# Patient Record
Sex: FEMALE | Race: WHITE | NOT HISPANIC OR LATINO | URBAN - METROPOLITAN AREA
[De-identification: names, ages, dates, MRNs, and addresses within clinical notes are randomized per-mention and may not be internally consistent; named-entity substitution may affect disease eponyms.]

---

## 2020-08-06 ENCOUNTER — OFFICE VISIT (OUTPATIENT)
Dept: URBAN - METROPOLITAN AREA CLINIC 33 | Facility: CLINIC | Age: 65
End: 2020-08-06

## 2020-09-03 ENCOUNTER — OFFICE VISIT (OUTPATIENT)
Dept: URBAN - METROPOLITAN AREA CLINIC 33 | Facility: CLINIC | Age: 65
End: 2020-09-03

## 2020-09-03 VITALS
SYSTOLIC BLOOD PRESSURE: 126 MMHG | HEART RATE: 76 BPM | OXYGEN SATURATION: 96 % | DIASTOLIC BLOOD PRESSURE: 72 MMHG | HEIGHT: 65 IN

## 2020-09-03 RX ORDER — FLUTICASONE PROPIONATE 50 UG/1
SPRAY 1 SPRAY INTO EACH NOSTRIL EVERY DAY SPRAY, METERED NASAL
Qty: 48 G | Refills: 0 | Status: ACTIVE | COMMUNITY

## 2020-09-03 RX ORDER — RANITIDINE HYDROCHLORIDE 150 MG/1
1 TABLET TABLET, FILM COATED ORAL
Status: DISCONTINUED | COMMUNITY

## 2020-09-03 RX ORDER — ASPIRIN 81 MG/1
1 TABLET TABLET, COATED ORAL ONCE A DAY
Status: ACTIVE | COMMUNITY

## 2020-09-03 RX ORDER — ESTRADIOL 0.1 MG/D
1 PATCH TO SKIN PATCH, EXTENDED RELEASE TRANSDERMAL
Status: ACTIVE | COMMUNITY

## 2020-09-03 RX ORDER — LACTOBACIL 2/BIFIDO 1/S.THERMO 450B CELL
AS DIRECTED PACKET (EA) ORAL
Status: ACTIVE | COMMUNITY
Start: 2015-09-03

## 2020-09-03 RX ORDER — UMECLIDINIUM BROMIDE AND VILANTEROL TRIFENATATE 62.5; 25 UG/1; UG/1
1 PUFF POWDER RESPIRATORY (INHALATION) ONCE A DAY
Status: ON HOLD | COMMUNITY

## 2020-09-03 RX ORDER — PIOGLITAZONE 30 MG/1
1 TABLET TABLET ORAL ONCE A DAY
Status: ACTIVE | COMMUNITY

## 2020-09-03 RX ORDER — COLESEVELAM HYDROCHLORIDE 625 MG/1
1-2  TABLET TABLET, FILM COATED ORAL
Status: DISCONTINUED | COMMUNITY
Start: 2017-04-07

## 2020-09-03 RX ORDER — OMEPRAZOLE 20 MG/1
1 CAPSULE CAPSULE, DELAYED RELEASE ORAL ONCE A DAY
Status: ON HOLD | COMMUNITY
Start: 2015-12-03

## 2020-09-03 RX ORDER — INSULIN GLARGINE 300 U/ML
INJECT 40 UNITS EVERY DAY AT BEDTIME FOR 90 DAYS INJECTION, SOLUTION SUBCUTANEOUS
Qty: 13.5 MILLILITER | Refills: 1 | Status: ACTIVE | COMMUNITY

## 2020-09-03 RX ORDER — PROPRANOLOL HYDROCHLORIDE 60 MG/1
TAKE 1 CAPSULE BY MOUTH EVERY DAY CAPSULE, EXTENDED RELEASE ORAL
Qty: 90 UNSPECIFIED | Refills: 0 | Status: ACTIVE | COMMUNITY

## 2020-09-03 RX ORDER — LACTOBACIL 2/BIFIDO 1/S.THERMO 900B CELL
AS DIRECTED PACKET (EA) ORAL
Status: ACTIVE | COMMUNITY

## 2020-09-03 RX ORDER — SEMAGLUTIDE 1.34 MG/ML
INJECT 0.5MG ONCE A WEEK AS DIRECTED INJECTION, SOLUTION SUBCUTANEOUS
Qty: 4.5 MILLILITER | Refills: 0 | Status: ACTIVE | COMMUNITY

## 2020-09-03 RX ORDER — CYANOCOBALAMIN (VITAMIN B-12) 1000 MCG
1 TABLET TABLET ORAL ONCE A DAY
Status: ACTIVE | COMMUNITY

## 2020-09-03 RX ORDER — ESTRADIOL 0.1 MG/D
1 PATCH TO SKIN PATCH TRANSDERMAL
Status: ACTIVE | COMMUNITY

## 2020-09-03 RX ORDER — CANAGLIFLOZIN 100 MG/1
1 TABLET TABLET, FILM COATED ORAL ONCE A DAY
Status: DISCONTINUED | COMMUNITY

## 2020-09-03 RX ORDER — SITAGLIPTIN 100 MG/1
1 TABLET TABLET, FILM COATED ORAL ONCE A DAY
Status: ACTIVE | COMMUNITY

## 2020-09-03 RX ORDER — FLUTICASONE PROPIONATE AND SALMETEROL 50; 250 UG/1; UG/1
1 PUFF POWDER RESPIRATORY (INHALATION) TWICE A DAY
Status: ON HOLD | COMMUNITY

## 2020-09-03 RX ORDER — RIFAXIMIN 550 MG/1
1 TABLET TABLET ORAL THREE TIMES A DAY
Qty: 42 TABLET | Refills: 2 | OUTPATIENT
Start: 2020-09-03

## 2020-09-03 RX ORDER — PROPRANOLOL HYDROCHLORIDE 60 MG/1
1 TABLET TABLET ORAL TWICE A DAY
Status: ACTIVE | COMMUNITY

## 2020-09-03 NOTE — EXAM-MIGRATED EXAMINATIONS
ABDOMEN: - bowel sounds present, no masses palpable, no organomegaly , no rebound tenderness, soft, nontender, nondistended;

## 2020-09-03 NOTE — HPI-MIGRATED HPI
;   ;   ;   ;   ;   ;   ;   ;   ;   ;     Heartburn : Patient presents today for a follow up of heartburn.  She has been taking Pepcid AC prn, maybe 2-3 times a month with relief of esophageal burning.  Admit associated cough, and horseness that occur post prandial. .    Since Dec. 2019 she admit 4 episodes of symptoms that begin with sulfur burps, then nausea diarrhea and vomiting.    Last visit (5/11/2017) voids fried foods, greasy foods, tomato-based foods and other acidic foods.   Symptoms characterized as retrosternal tightening accompanied by burning sensation and sharp pain at the base of the throat with an associated dry throat. Onset was over 10 years ago. She admits associated nausea without emesis. She denies dysphagia, change in appetite. She does admit intermittent early satiety.  Of note, she tried Omeprazole 20 MG x 1 week and it caused diarrhea episodes and indigestion. ;   Early Satiety : Patient admit episodes of early satiety.  She will get mild relief with consuming small portion meals.      Last visit (5/11/2017) Patient continues to admit intermittent early satiety that occurs a few days a week, since the year.  She denies a decreased appetite.  She has been trying to eat small meals throughout the day but the nature of her work makes it difficult to eat multiple times in a work day.;   Abdominal Pain : Patient admit episodes of abdominal pain.  Described as a dull ache that is located lateral to umbilicus radiating to lower abdomen. Lasting from minutes to hours at a time.   Since Dec. 2019 she admit 4 episodes of symptoms that begin with sulfur burps, then nausea diarrhea and vomiting.       Last visit (5/11/2017)  Patient admits marked improvement to generalized abdominal pain symptoms since starting on Viberzi. She has continued to deny any episodes of the pain since coming off the medication after 3/30/17. ;   Nausea/Vomiting : Since Dec. 2019 she admit 4 episodes of symptoms that begin with sulfur burps, then nausea, diarrhea and vomiting.      Last visit (5/11/2017)  Patient admits marked improvement to nausea since coming off Trulicity. Denies any episodes of nausea since her last visit.;   Bloating : Continue to experience generalized abdominal bloating. Symptoms are present upon waking up and will persist throughout the day.  Has tried Gas-X without benefit.   Last visit (5/11/2017) Patient admits improvement to bloating since her last visit. Symptoms occur intermittently, a few times per week usually after eating. Denies gassiness.;   Fecal incontinence : Patient admit at least 2 episodes of fecal incontinence per month.     Last visit (5/11/2017)  Patient continues to deny any episodes of fecal incontinence or urgency despite coming off the Viberzi. She continues to take VSL#3 and Welchol 1 tablet per day.;   Melena : Denies any new episodes of melena.    Last visit (5/11/2017)  Patient continues to deny any melena.;   Shortness of Breath : Patient admits episodes of Shortness of Breathe that has been present since 2012 ,which she attribute to fibromyalgia.  She report being told she had beginning stage of emphysemas in 2012.  She admit having a pulmonary follow up with Dr. Catherine at [facility] 2 years ago with normal findings. ;   IBS-D : Currently admit 5-6 bowel movements per day. Stools are form to loose to watery.  She has been on a bland diet for the past 3-4 years with a little decrease in the frequency.   Denies any melena, blood or mucus in stool.     Last visit (5/11/2017)  Patient presents today to follow up for her IBS-D. Patient continues to take VSL#3 and is now taking Welchol daily with good control of symptoms. She can only recall about 1 bout of diarrhea since her last visit. She will use Imodium prn (infrequently).  Of note, patient was contacted by our office on March 24, 2017 and notified to stop taking Viberzi as per recent literature from the manufacture concerning Viberzi treatment with patients who have had a cholecystectomy. She has discontinued the medication and started on Welchol 625 mg and is currently taking 1 pill per day as she states any more than this will cause her to have abdominal discomfort. She started on EnteraGam but she states she discontinued it after 2 weeks because she felt it made her symptoms worse, and it made her symptoms. She continues to adhere to a bland diet.  She currently admit 2 bowel movements per day with normal and formed stools.  Denies any melena, blood or mucus in stool.   She was first diagnosed in the mid 90's. Course has been persistent for the last 20 years. Patient was diagnosed with Type II Diabetes June 1, 2016 and was initially treated with Metformin 1000 mg QD, which caused her diarrhea episodes to increase.  She is currently being treated with Invokana 100 mg qd, Januvia 100 mg and Pioglitazone HCl 30 MG Tablet.  Initially she was on Metformin 1000 mg 2 x per day. She developed an allergic reaction, so she was taken off Metformin and put on Glipizide which did not keep her sugar low enough. She subsequently began taking time-released Metformin 1000 mg 2 x per day in tandem with the Glipizide, then Metformin was increased to 2000 mg, then it was reduced to 1000 mg again, as diarrhea and associated digestive problems were exacerbated at the higher dose.  She states that she has been avoiding certain kinds of dietary fibers in order to prevent diarrhea, but she admits that almost any vegetables she eats will aggravate her symptoms.   Denies any fecal incontinence since her last visit.   ;   Cough : Admit cough, and hoarseness associated with heartburn.  Symptoms occur post prandial, but not with every meal. Onset 4-5 years ago.  Denies aggravating or alleviating factors.;

## 2020-10-21 ENCOUNTER — TELEPHONE ENCOUNTER (OUTPATIENT)
Dept: URBAN - METROPOLITAN AREA CLINIC 35 | Facility: CLINIC | Age: 65
End: 2020-10-21

## 2020-11-01 ENCOUNTER — LAB OUTSIDE AN ENCOUNTER (OUTPATIENT)
Dept: URBAN - METROPOLITAN AREA CLINIC 35 | Facility: CLINIC | Age: 65
End: 2020-11-01

## 2020-11-05 LAB
CALPROTECTIN, STOOL - QDX: (no result)
GASTROINTESTINAL PATHOGEN: (no result)
PANCREATICELASTASE ELISA, STOOL: (no result)
STOOL, WHITE BLOOD CELLS: (no result)

## 2020-11-09 ENCOUNTER — TELEPHONE ENCOUNTER (OUTPATIENT)
Dept: URBAN - METROPOLITAN AREA CLINIC 35 | Facility: CLINIC | Age: 65
End: 2020-11-09

## 2020-12-01 ENCOUNTER — TELEPHONE ENCOUNTER (OUTPATIENT)
Dept: URBAN - METROPOLITAN AREA CLINIC 35 | Facility: CLINIC | Age: 65
End: 2020-12-01

## 2020-12-03 ENCOUNTER — OFFICE VISIT (OUTPATIENT)
Dept: URBAN - METROPOLITAN AREA CLINIC 33 | Facility: CLINIC | Age: 65
End: 2020-12-03

## 2020-12-03 VITALS
HEART RATE: 68 BPM | DIASTOLIC BLOOD PRESSURE: 64 MMHG | WEIGHT: 227 LBS | HEIGHT: 65 IN | TEMPERATURE: 96.8 F | OXYGEN SATURATION: 97 % | BODY MASS INDEX: 37.82 KG/M2 | SYSTOLIC BLOOD PRESSURE: 126 MMHG

## 2020-12-03 RX ORDER — CYANOCOBALAMIN (VITAMIN B-12) 1000 MCG
1 TABLET TABLET ORAL ONCE A DAY
Status: ACTIVE | COMMUNITY

## 2020-12-03 RX ORDER — PROPRANOLOL HYDROCHLORIDE 60 MG/1
1 TABLET TABLET ORAL TWICE A DAY
Status: ACTIVE | COMMUNITY

## 2020-12-03 RX ORDER — RIFAXIMIN 550 MG/1
1 TABLET TABLET ORAL THREE TIMES A DAY
Qty: 42 TABLET | Refills: 2 | Status: ACTIVE | COMMUNITY
Start: 2020-09-03

## 2020-12-03 RX ORDER — OMEPRAZOLE 20 MG/1
1 CAPSULE CAPSULE, DELAYED RELEASE ORAL ONCE A DAY
Status: ON HOLD | COMMUNITY
Start: 2015-12-03

## 2020-12-03 RX ORDER — LACTOBACIL 2/BIFIDO 1/S.THERMO 450B CELL
AS DIRECTED PACKET (EA) ORAL
Status: ACTIVE | COMMUNITY
Start: 2015-09-03

## 2020-12-03 RX ORDER — SITAGLIPTIN 100 MG/1
1 TABLET TABLET, FILM COATED ORAL ONCE A DAY
Status: ACTIVE | COMMUNITY

## 2020-12-03 RX ORDER — FLUTICASONE PROPIONATE 50 UG/1
SPRAY 1 SPRAY INTO EACH NOSTRIL EVERY DAY SPRAY, METERED NASAL
Qty: 48 G | Refills: 0 | Status: ACTIVE | COMMUNITY

## 2020-12-03 RX ORDER — FLUTICASONE PROPIONATE AND SALMETEROL 50; 250 UG/1; UG/1
1 PUFF POWDER RESPIRATORY (INHALATION) TWICE A DAY
Status: ON HOLD | COMMUNITY

## 2020-12-03 RX ORDER — INSULIN GLARGINE 300 U/ML
INJECT 40 UNITS EVERY DAY AT BEDTIME FOR 90 DAYS INJECTION, SOLUTION SUBCUTANEOUS
Qty: 13.5 MILLILITER | Refills: 1 | Status: ACTIVE | COMMUNITY

## 2020-12-03 RX ORDER — SEMAGLUTIDE 1.34 MG/ML
INJECT 0.5MG ONCE A WEEK AS DIRECTED INJECTION, SOLUTION SUBCUTANEOUS
Qty: 4.5 MILLILITER | Refills: 0 | Status: ACTIVE | COMMUNITY

## 2020-12-03 RX ORDER — ESTRADIOL 0.1 MG/D
1 PATCH TO SKIN PATCH TRANSDERMAL
Status: ACTIVE | COMMUNITY

## 2020-12-03 RX ORDER — ESTRADIOL 0.1 MG/D
1 PATCH TO SKIN PATCH, EXTENDED RELEASE TRANSDERMAL
Status: ACTIVE | COMMUNITY

## 2020-12-03 RX ORDER — PROPRANOLOL HYDROCHLORIDE 60 MG/1
TAKE 1 CAPSULE BY MOUTH EVERY DAY CAPSULE, EXTENDED RELEASE ORAL
Qty: 90 UNSPECIFIED | Refills: 0 | Status: ON HOLD | COMMUNITY

## 2020-12-03 RX ORDER — PIOGLITAZONE 30 MG/1
1 TABLET TABLET ORAL ONCE A DAY
Status: ACTIVE | COMMUNITY

## 2020-12-03 RX ORDER — RIFAXIMIN 550 MG/1
1 TABLET TABLET ORAL THREE TIMES A DAY
Qty: 42 TABLET | Refills: 2 | OUTPATIENT
Start: 2020-09-03

## 2020-12-03 RX ORDER — LACTOBACIL 2/BIFIDO 1/S.THERMO 900B CELL
AS DIRECTED PACKET (EA) ORAL
Status: ON HOLD | COMMUNITY

## 2020-12-03 RX ORDER — ASPIRIN 81 MG/1
1 TABLET TABLET, COATED ORAL ONCE A DAY
Status: ACTIVE | COMMUNITY

## 2020-12-03 RX ORDER — UMECLIDINIUM BROMIDE AND VILANTEROL TRIFENATATE 62.5; 25 UG/1; UG/1
1 PUFF POWDER RESPIRATORY (INHALATION) ONCE A DAY
Status: ON HOLD | COMMUNITY

## 2020-12-03 NOTE — HPI-MIGRATED HPI
;   ;   ;   ;   ;   ;   ;   ;   ;   ;     Heartburn :     Last visit (9/3/2020) Patient presents today for a follow up of heartburn.  She has been taking Pepcid AC prn, maybe 2-3 times a month with relief of esophageal burning.  Admit associated cough, and horseness that occur post prandial. .    Since Dec. 2019 she admit 4 episodes of symptoms that begin with sulfur burps, then nausea diarrhea and vomiting.    Last visit (5/11/2017) voids fried foods, greasy foods, tomato-based foods and other acidic foods.   Symptoms characterized as retrosternal tightening accompanied by burning sensation and sharp pain at the base of the throat with an associated dry throat. Onset was over 10 years ago. She admits associated nausea without emesis. She denies dysphagia, change in appetite. She does admit intermittent early satiety.  Of note, she tried Omeprazole 20 MG x 1 week and it caused diarrhea episodes and indigestion. ;   Early Satiety : She denies symptoms of early satiety.     Last visit (9/3/2020)  Patient admit episodes of early satiety.  She will get mild relief with consuming small portion meals.      Last visit (5/11/2017) Patient continues to admit intermittent early satiety that occurs a few days a week, since the year.  She denies a decreased appetite.  She has been trying to eat small meals throughout the day but the nature of her work makes it difficult to eat multiple times in a work day.;   Abdominal Pain : She admits abdominal discomfort not pain. Reports that she will become nauseated after a few bites. She reports an epigastric discomfort she will then take a Pepto Bismol tablet and it will eventually subside on its on.       Last visit (9/3/2020)  Patient admit episodes of abdominal pain.  Described as a dull ache that is located lateral to umbilicus radiating to lower abdomen. Lasting from minutes to hours at a time.   Since Dec. 2019 she admit 4 episodes of symptoms that begin with sulfur burps, then nausea diarrhea and vomiting.       Last visit (5/11/2017)  Patient admits marked improvement to generalized abdominal pain symptoms since starting on Viberzi. She has continued to deny any episodes of the pain since coming off the medication after 3/30/17. ;   Nausea/Vomiting : She stated that she has decreased her insulin 5 mL every night due nausea. She reports that it has decreased her nausea.     Last visit (9/3/2020)  Since Dec. 2019 she admit 4 episodes of symptoms that begin with sulfur burps, then nausea, diarrhea and vomiting.      Last visit (5/11/2017)  Patient admits marked improvement to nausea since coming off Trulicity. Denies any episodes of nausea since her last visit.;   Bloating :  She admits daily bloating, she reports abdominal distention in the evening.     Last visit (9/3/2020) Continue to experience generalized abdominal bloating. Symptoms are present upon waking up and will persist throughout the day.  Has tried Gas-X without benefit.   Last visit (5/11/2017) Patient admits improvement to bloating since her last visit. Symptoms occur intermittently, a few times per week usually after eating. Denies gassiness.;   Fecal incontinence : Patient admits that she still experience fecal incontinecnce 1-2 times a month. She said that it depends on what she eats.     Last visit (9/3/2020) Patient admit at least 2 episodes of fecal incontinence per month.     Last visit (5/11/2017)  Patient continues to deny any episodes of fecal incontinence or urgency despite coming off the Viberzi. She continues to take VSL#3 and Welchol 1 tablet per day.;   Melena :    Last visit (9/3/2020) Denies any new episodes of melena.    Last visit (5/11/2017)  Patient continues to deny any melena.;   Shortness of Breath :  She reports continued episodes of SOB without exertion     Last visit (9/3/2020)  Patient admits episodes of Shortness of Breathe that has been present since 2012 ,which she attribute to fibromyalgia.  She report being told she had beginning stage of emphysemas in 2012.  She admit having a pulmonary follow up with Dr. Catherine at [facility] 2 years ago with normal findings. ;   IBS-D : Patient presents today to review QDx stool study results and follow up of IBS-D.   Patient admits the use of Viberzi she reports that the medication helped a great deal. She had to stop taking it due to cholecystectomy.   Currently reports 2-3 bowel movements a day other days 4-5 bowel movements. Her stools alternate between soft and formed and loose and watery. She reports she has seen bright red blood on the toilet paper when she wipes.   Last visit (9/3/2020) Currently admit 5-6 bowel movements per day. Stools are form to loose to watery.  She has been on a bland diet for the past 3-4 years with a little decrease in the frequency.   Denies any melena, blood or mucus in stool.     Last visit (5/11/2017)  Patient presents today to follow up for her IBS-D. Patient continues to take VSL#3 and is now taking Welchol daily with good control of symptoms. She can only recall about 1 bout of diarrhea since her last visit. She will use Imodium prn (infrequently).  Of note, patient was contacted by our office on March 24, 2017 and notified to stop taking Viberzi as per recent literature from the manufacture concerning Viberzi treatment with patients who have had a cholecystectomy. She has discontinued the medication and started on Welchol 625 mg and is currently taking 1 pill per day as she states any more than this will cause her to have abdominal discomfort. She started on EnteraGam but she states she discontinued it after 2 weeks because she felt it made her symptoms worse, and it made her symptoms. She continues to adhere to a bland diet.  She currently admit 2 bowel movements per day with normal and formed stools.  Denies any melena, blood or mucus in stool.   She was first diagnosed in the mid 90's. Course has been persistent for the last 20 years. Patient was diagnosed with Type II Diabetes June 1, 2016 and was initially treated with Metformin 1000 mg QD, which caused her diarrhea episodes to increase.  She is currently being treated with Invokana 100 mg qd, Januvia 100 mg and Pioglitazone HCl 30 MG Tablet.  Initially she was on Metformin 1000 mg 2 x per day. She developed an allergic reaction, so she was taken off Metformin and put on Glipizide which did not keep her sugar low enough. She subsequently began taking time-released Metformin 1000 mg 2 x per day in tandem with the Glipizide, then Metformin was increased to 2000 mg, then it was reduced to 1000 mg again, as diarrhea and associated digestive problems were exacerbated at the higher dose.  She states that she has been avoiding certain kinds of dietary fibers in order to prevent diarrhea, but she admits that almost any vegetables she eats will aggravate her symptoms.   Denies any fecal incontinence since her last visit.   ;   Cough : She admits a cough and hoarsness due to acid reflux. SHe reports that her cough can occasional be productive.     Last visit (9/3/2020) Admit cough, and hoarseness associated with heartburn.  Symptoms occur post prandial, but not with every meal. Onset 4-5 years ago.  Denies aggravating or alleviating factors.;

## 2021-03-04 ENCOUNTER — OFFICE VISIT (OUTPATIENT)
Dept: URBAN - METROPOLITAN AREA CLINIC 33 | Facility: CLINIC | Age: 66
End: 2021-03-04

## 2021-12-02 ENCOUNTER — DASHBOARD ENCOUNTERS (OUTPATIENT)
Age: 66
End: 2021-12-02

## 2021-12-02 ENCOUNTER — OFFICE VISIT (OUTPATIENT)
Dept: URBAN - METROPOLITAN AREA CLINIC 33 | Facility: CLINIC | Age: 66
End: 2021-12-02

## 2021-12-02 VITALS
HEART RATE: 70 BPM | WEIGHT: 226 LBS | BODY MASS INDEX: 37.65 KG/M2 | DIASTOLIC BLOOD PRESSURE: 78 MMHG | SYSTOLIC BLOOD PRESSURE: 118 MMHG | OXYGEN SATURATION: 97 % | HEIGHT: 65 IN

## 2021-12-02 PROBLEM — 266433003 GASTRO-ESOPHAGEAL REFLUX DISEASE WITH ESOPHAGITIS: Status: ACTIVE | Noted: 2017-05-11

## 2021-12-02 RX ORDER — PANTOPRAZOLE SODIUM 40 MG/1
1 TABLET TABLET, DELAYED RELEASE ORAL
OUTPATIENT

## 2021-12-02 RX ORDER — UMECLIDINIUM BROMIDE AND VILANTEROL TRIFENATATE 62.5; 25 UG/1; UG/1
1 PUFF POWDER RESPIRATORY (INHALATION) ONCE A DAY
Status: DISCONTINUED | COMMUNITY

## 2021-12-02 RX ORDER — PROPRANOLOL HYDROCHLORIDE 60 MG/1
TAKE 1 CAPSULE BY MOUTH EVERY DAY CAPSULE, EXTENDED RELEASE ORAL
Qty: 90 UNSPECIFIED | Refills: 0 | Status: DISCONTINUED | COMMUNITY

## 2021-12-02 RX ORDER — DIPHENOXYLATE HYDROCHLORIDE AND ATROPINE SULFATE 2.5; .025 MG/1; MG/1
1 TABLET AS NEEDED TABLET ORAL
Status: ACTIVE | COMMUNITY

## 2021-12-02 RX ORDER — RIFAXIMIN 550 MG/1
1 TABLET TABLET ORAL THREE TIMES A DAY
Qty: 42 TABLET | Refills: 2 | Status: DISCONTINUED | COMMUNITY
Start: 2020-09-03

## 2021-12-02 RX ORDER — PIOGLITAZONE 30 MG/1
1 TABLET TABLET ORAL ONCE A DAY
Status: ACTIVE | COMMUNITY

## 2021-12-02 RX ORDER — SITAGLIPTIN 100 MG/1
1 TABLET TABLET, FILM COATED ORAL ONCE A DAY
Status: ACTIVE | COMMUNITY

## 2021-12-02 RX ORDER — FLUTICASONE PROPIONATE 50 UG/1
SPRAY 1 SPRAY INTO EACH NOSTRIL EVERY DAY SPRAY, METERED NASAL
Qty: 48 G | Refills: 0 | Status: ACTIVE | COMMUNITY

## 2021-12-02 RX ORDER — CYANOCOBALAMIN (VITAMIN B-12) 1000 MCG
1 TABLET TABLET ORAL ONCE A DAY
Status: ACTIVE | COMMUNITY

## 2021-12-02 RX ORDER — OMEPRAZOLE 20 MG/1
1 CAPSULE CAPSULE, DELAYED RELEASE ORAL ONCE A DAY
Status: DISCONTINUED | COMMUNITY
Start: 2015-12-03

## 2021-12-02 RX ORDER — LACTOBACIL 2/BIFIDO 1/S.THERMO 900B CELL
AS DIRECTED PACKET (EA) ORAL
Status: DISCONTINUED | COMMUNITY

## 2021-12-02 RX ORDER — PANTOPRAZOLE SODIUM 40 MG/1
1 TABLET TABLET, DELAYED RELEASE ORAL ONCE A DAY
Qty: 30 | Status: ACTIVE | COMMUNITY

## 2021-12-02 RX ORDER — PROPRANOLOL HYDROCHLORIDE 60 MG/1
1 TABLET TABLET ORAL TWICE A DAY
Status: ACTIVE | COMMUNITY

## 2021-12-02 RX ORDER — SEMAGLUTIDE 1.34 MG/ML
INJECT 0.5MG ONCE A WEEK AS DIRECTED INJECTION, SOLUTION SUBCUTANEOUS
Qty: 4.5 MILLILITER | Refills: 0 | Status: ACTIVE | COMMUNITY

## 2021-12-02 RX ORDER — ASPIRIN 81 MG/1
1 TABLET TABLET, COATED ORAL ONCE A DAY
Status: ACTIVE | COMMUNITY

## 2021-12-02 RX ORDER — ESTRADIOL 0.1 MG/D
1 PATCH TO SKIN PATCH, EXTENDED RELEASE TRANSDERMAL
Status: ACTIVE | COMMUNITY

## 2021-12-02 RX ORDER — PNV NO.95/FERROUS FUM/FOLIC AC 28MG-0.8MG
AS DIRECTED TABLET ORAL
Status: ACTIVE | COMMUNITY

## 2021-12-02 RX ORDER — LACTOBACIL 2/BIFIDO 1/S.THERMO 450B CELL
AS DIRECTED PACKET (EA) ORAL
Status: DISCONTINUED | COMMUNITY
Start: 2015-09-03

## 2021-12-02 RX ORDER — FLUTICASONE PROPIONATE AND SALMETEROL 50; 250 UG/1; UG/1
1 PUFF POWDER RESPIRATORY (INHALATION) TWICE A DAY
Status: DISCONTINUED | COMMUNITY

## 2021-12-02 RX ORDER — INSULIN GLARGINE 300 U/ML
INJECT 40 UNITS EVERY DAY AT BEDTIME FOR 90 DAYS INJECTION, SOLUTION SUBCUTANEOUS
Qty: 13.5 MILLILITER | Refills: 1 | Status: ACTIVE | COMMUNITY

## 2021-12-02 RX ORDER — ESTRADIOL 0.1 MG/D
1 PATCH TO SKIN PATCH TRANSDERMAL
Status: ACTIVE | COMMUNITY

## 2021-12-02 NOTE — HPI-MIGRATED HPI
;   ;   ;   ;   ;   ;   ;   ;   ;   ;     Heartburn : She admits control of symptoms with the use of Pantoprazole 40 mg 1-2 times a month.   Last visit (12/3/2020)  She has been taking Pepcid AC prn, maybe 2-3 times a month with relief of esophageal burning.  Admit associated cough, and horseness that occur post prandial.  Last visit (9/3/2020) Patient presents today for a follow up of heartburn.  She has been taking Pepcid AC prn, maybe 2-3 times a month with relief of esophageal burning.  Admit associated cough, and horseness that occur post prandial. .    Since Dec. 2019 she admit 4 episodes of symptoms that begin with sulfur burps, then nausea diarrhea and vomiting.    Last visit (5/11/2017) voids fried foods, greasy foods, tomato-based foods and other acidic foods.   Symptoms characterized as retrosternal tightening accompanied by burning sensation and sharp pain at the base of the throat with an associated dry throat. Onset was over 10 years ago. She admits associated nausea without emesis. She denies dysphagia, change in appetite. She does admit intermittent early satiety.  Of note, she tried Omeprazole 20 MG x 1 week and it caused diarrhea episodes and indigestion.;   Early Satiety : She denies symptoms of early satiety.   Last visit (12/3/2020) She denies symptoms of early satiety.   Last visit (9/3/2020)  Patient admit episodes of early satiety.  She will get mild relief with consuming small portion meals.      Last visit (5/11/2017) Patient continues to admit intermittent early satiety that occurs a few days a week, since the year.  She denies a decreased appetite.  She has been trying to eat small meals throughout the day but the nature of her work makes it difficult to eat multiple times in a work day.;   Abdominal Pain : Admits generalized abdominal discomfort whenever she take One A Day for Women over 50  or Biotin.      Last visit (12/3/2020) She admits abdominal discomfort not pain. Reports that she will become nauseated after a few bites. She reports an epigastric discomfort she will then take a Pepto Bismol tablet and it will eventually subside on its on.     Last visit (9/3/2020)  Patient admit episodes of abdominal pain.  Described as a dull ache that is located lateral to umbilicus radiating to lower abdomen. Lasting from minutes to hours at a time.   Since Dec. 2019 she admit 4 episodes of symptoms that begin with sulfur burps, then nausea diarrhea and vomiting.       Last visit (5/11/2017)  Patient admits marked improvement to generalized abdominal pain symptoms since starting on Viberzi. She has continued to deny any episodes of the pain since coming off the medication after 3/30/17.;   Nausea/Vomiting : Admits nausea at night after she take her insulin.    Last visit (12/3/2020) She stated that she has decreased her insulin 5 ml every night due nausea. She reports that it has decreased her nausea.     Last visit (9/3/2020)  Since Dec. 2019 she admit 4 episodes of symptoms that begin with sulfur burps, then nausea, diarrhea and vomiting.      Last visit (5/11/2017)  Patient admits marked improvement to nausea since coming off Trulicity. Denies any episodes of nausea since her last visit.;   Bloating : She admits abdominal distention in the evening that occur without provocation.      Last visit (12/3/2020) She admits daily bloating, she reports abdominal distention in the evening.    Last visit (9/3/2020) Continue to experience generalized abdominal bloating. Symptoms are present upon waking up and will persist throughout the day.  Has tried Gas-X without benefit.   Last visit (5/11/2017) Patient admits improvement to bloating since her last visit. Symptoms occur intermittently, a few times per week usually after eating. Denies gassiness.;   Fecal incontinence : Denies any episodes since last visit.    Last visit (12/3/2020) Patient admits that she still experience fecal incontinence 1-2 times a month. She said that it depends on what she eats.    Last visit (9/3/2020) Patient admit at least 2 episodes of fecal incontinence per month.     Last visit (5/11/2017)  Patient continues to deny any episodes of fecal incontinence or urgency despite coming off the Viberzi. She continues to take VSL#3 and Welchol 1 tablet per day.;   Melena : Denies any new episodes of melena.   Last visit (12/3/2020)  Denies any new episodes of melena.  Last visit (9/3/2020) Denies any new episodes of melena.    Last visit (5/11/2017)  Patient continues to deny any melena.;   Shortness of Breath : Continue to have episodes of SOB without exertion.    Last visit (12/3/2020) She reports continued episodes of SOB without exertion    Last visit (9/3/2020)  Patient admits episodes of Shortness of Breath that has been present since 2012 .  She report being told she had beginning stage of emphysema in 2012.  She admit having a pulmonary follow up with Dr. Catherine at [facility] 2 years ago with normal findings.;   IBS-D : Patient presents today for a follow up of IBS-D.   Currently report 2 bowel movements per day without strain.  Stools has been formeed since she has been taking Provitalize thermogenic probiotic 2 QD since Jan.2021.  Denies melena, blood or mucus in stools, rectal pain/bleeding or pruritus ani.  She d/c the Xifaxan 550 MG, 1 TID after 5 days, due to experiencing terrible headaches.  She has been able to reintroduce salads in her diet a couple times a week.   Last visit (12/3/2020) Patient presents today to review QDx stool study results and follow up of IBS-D.   Patient admits the use of Viberzi she reports that the medication helped a great deal. She had to stop taking it due to cholecystectomy.   Currently reports 2-3 bowel movements a day other days 4-5 bowel movements. Her stools alternate between soft and formed and loose and watery. She reports she has seen bright red blood on the toilet paper when she wipes.   Last visit (9/3/2020) Currently admit 5-6 bowel movements per day. Stools are form to loose to watery.  She has been on a bland diet for the past 3-4 years with a little decrease in the frequency.   Denies any melena, blood or mucus in stool.     Last visit (5/11/2017)  Patient presents today to follow up for her IBS-D. Patient continues to take VSL#3 and is now taking Welchol daily with good control of symptoms. She can only recall about 1 bout of diarrhea since her last visit. She will use Imodium prn (infrequently).  Of note, patient was contacted by our office on March 24, 2017 and notified to stop taking Viberzi as per recent literature from the manufacture concerning Viberzi treatment with patients who have had a cholecystectomy. She has discontinued the medication and started on Welchol 625 mg and is currently taking 1 pill per day as she states any more than this will cause her to have abdominal discomfort. She started on EnteraGam but she states she discontinued it after 2 weeks because she felt it made her symptoms worse, and it made her symptoms. She continues to adhere to a bland diet.  She currently admit 2 bowel movements per day with normal and formed stools.  Denies any melena, blood or mucus in stool.   She was first diagnosed in the mid 90's. Course has been persistent for the last 20 years. Patient was diagnosed with Type II Diabetes June 1, 2016 and was initially treated with Metformin 1000 mg QD, which caused her diarrhea episodes to increase.  She is currently being treated with Invokana 100 mg qd, Januvia 100 mg and Pioglitazone HCl 30 MG Tablet.  Initially she was on Metformin 1000 mg 2 x per day. She developed an allergic reaction, so she was taken off Metformin and put on Glipizide which did not keep her sugar low enough. She subsequently began taking time-released Metformin 1000 mg 2 x per day in tandem with the Glipizide, then Metformin was increased to 2000 mg, then it was reduced to 1000 mg again, as diarrhea and associated digestive problems were exacerbated at the higher dose.  She states that she has been avoiding certain kinds of dietary fibers in order to prevent diarrhea, but she admits that almost any vegetables she eats will aggravate her symptoms.   Denies any fecal incontinence since her last visit.;   Cough : Admits infrequent coughing episodes whenever she eat, but not frequent.      Last visit (12/3/2020) She admits a cough and hoarsness due to acid reflux. SHe reports that her cough can occasional be productive.     Last visit (9/3/2020) Admit cough, and hoarseness associated with heartburn.  Symptoms occur post prandial, but not with every meal. Onset 4-5 years ago.  Denies aggravating or alleviating factors.;

## 2022-06-02 ENCOUNTER — OFFICE VISIT (OUTPATIENT)
Dept: URBAN - METROPOLITAN AREA CLINIC 33 | Facility: CLINIC | Age: 67
End: 2022-06-02